# Patient Record
(demographics unavailable — no encounter records)

---

## 2025-01-03 NOTE — ASSESSMENT
[FreeTextEntry1] : follow up  Was recently hospitalized for Influenza, SVTs, COPD exacerbations feeling much better now   DM- has been well controlled   HTN- elevated today improved on recheck and was rushing out and forgot to take meds

## 2025-01-03 NOTE — HISTORY OF PRESENT ILLNESS
[FreeTextEntry1] : follow up [de-identified] : follow up  Was recently hospitalized for Influenza, SVTs, COPD exacerbations feeling much better now   DM- has been well controlled   HTN- elevated today

## 2025-01-03 NOTE — HEALTH RISK ASSESSMENT
[Intercurrent ED visits] : went to ED [No] : In the past 12 months have you used drugs other than those required for medical reasons? No [No falls in past year] : Patient reported no falls in the past year [0] : 2) Feeling down, depressed, or hopeless: Not at all (0) [de-identified] : no [de-identified] : walking [de-identified] : fair

## 2025-06-13 NOTE — COUNSELING
[Cessation strategies including cessation program discussed] : Cessation strategies including cessation program discussed [Use of nicotine replacement therapies and other medications discussed] : Use of nicotine replacement therapies and other medications discussed [Encouraged to pick a quit date and identify support needed to quit] : Encouraged to pick a quit date and identify support needed to quit [Yes] : Willing to quit smoking [ - Annual Lung Cancer Screening/Share Decision Making Discussion] : Annual Lung Cancer Screening/Share Decision Making Discussion. (I have advised this patient to have a Low Dose CT (LDCT) scan of the lungs and have discussed the following with the patient in a shared decision making discussion:   Benefits of Detection and Early Treatment: There is adequate evidence that annual screening for lung cancer with LDCT in a population of high-risk persons can prevent a substantial number of lung cancer-related deaths. The magnitude of benefit depends on the individual patient's risk for lung cancer, as those who are at highest risk are most likely to benefit. Screening cannot prevent most lung cancer-related deaths, and does not replace smoking cessation. Harms of Detection and Early Intervention and Treatment: The harms associated with LDCT screening include false-negative and false-positive results, incidental findings, over diagnosis, and radiation exposure. False-positive LDCT results occur in a substantial proportion of screened persons; 95% of all positive results do not lead to a diagnosis of cancer. In a high-quality screening program, further imaging can resolve most false-positive results; however, some patients may require invasive procedures. Radiation harms, including cancer resulting from cumulative exposure to radiation, vary depending on the age at the start of screening; the number of scans received; and the person's exposure to other sources of radiation, particularly other medical imaging.) [FreeTextEntry1] : 6

## 2025-06-13 NOTE — ASSESSMENT
[Vaccines Reviewed] : Immunizations reviewed today. Please see immunization details in the vaccine log within the immunization flowsheet.  [FreeTextEntry1] : annual exam  went to hospital last month for svt's  will refer to EP  HTN well controlled  DM- has been well controlled  health maintenance- due for colonoscopy due for mammo referrals placed  smoker for 45 years ppd= 45 year pack history will order lung cancer screening  spent > 5 minutes discussing smoking cessation   Blood work drawn in office today

## 2025-06-13 NOTE — HEALTH RISK ASSESSMENT
[Fair] : ~his/her~ current health as fair  [Good] : ~his/her~  mood as  good [Intercurrent ED visits] : went to ED [No] : In the past 12 months have you used drugs other than those required for medical reasons? No [No falls in past year] : Patient reported no falls in the past year [0] : 2) Feeling down, depressed, or hopeless: Not at all (0) [Current] : Current [Fully functional (bathing, dressing, toileting, transferring, walking, feeding)] : Fully functional (bathing, dressing, toileting, transferring, walking, feeding) [Fully functional (using the telephone, shopping, preparing meals, housekeeping, doing laundry, using] : Fully functional and needs no help or supervision to perform IADLs (using the telephone, shopping, preparing meals, housekeeping, doing laundry, using transportation, managing medications and managing finances) [Smoke Detector] : smoke detector [Carbon Monoxide Detector] : carbon monoxide detector [Seat Belt] :  uses seat belt [PHQ-2 Negative - No further assessment needed] : PHQ-2 Negative - No further assessment needed [Patient reported mammogram was normal] : Patient reported mammogram was normal [FreeTextEntry1] : none [de-identified] : none [de-identified] : walking [de-identified] : okay [[id number]] : 0 [de-identified] : smoker for 45 years 1 ppd [Reports changes in hearing] : Reports no changes in hearing [Reports changes in vision] : Reports no changes in vision [Sunscreen] : does not use sunscreen [MammogramDate] : 03/24 [PapSmearComments] : 1999 [ColonoscopyComments] : will order

## 2025-06-13 NOTE — HISTORY OF PRESENT ILLNESS
[FreeTextEntry1] : annual exam [de-identified] : annual exam  went to hospital last month for svt's  will refer to EP  HTN well controlled  DM- has been well controlled

## 2025-07-03 NOTE — CARDIOLOGY SUMMARY
[de-identified] : 7/3/25 NSR 64, PRWP [de-identified] : TTE 2024: 1. Technically difficult image quality. 2. Left ventricular cavity is severely dilated. Left ventricular systolic function is normal with an ejection fraction of 55 % by Vaz's method of disks. 3. Left atrium is severely dilated. 4. Normal right ventricular cavity size and normal right ventricular systolic function. 5. Moderate to severe mitral regurgitation. 6. Trileaflet aortic valve with normal systolic excursion. 7. Mild to moderate aortic regurgitation. 8. Mild pulmonic regurgitation. 9. The inferior vena cava is dilated (dilated >2.1cm) with abnormal inspiratory collapse (abnormal <50%) consistent with elevated right atrial pressure ( R 15, range 10-20mmHg). 10. No pericardial effusion seen.  TTE in 2019 with normalized EF [de-identified] :  LHC with normal coronaries

## 2025-07-03 NOTE — DISCUSSION/SUMMARY
[EKG obtained to assist in diagnosis and management of assessed problem(s)] : EKG obtained to assist in diagnosis and management of assessed problem(s) [FreeTextEntry1] : Che Munson is a 60 u/o woman with Hx of HTN, obesity, hx of HFrEF, mod-severe MR and PSVT  Impression:  1. PSVT:  EKG performed today to assess for presence of conduction disease, pre-excitation or atrial fibrillation reveals NSR. She has not started metoprolol 25 mg daily. A 4 week Zio AT will be placed today to assess for arrhythmias including SVT.   2. HFpEF: Hx of reduced LVEF in past, ?secondary to severe MR. Last TTE with normalized LV function. MR has bounced between mild and severe MR  Will discuss referral to structural heart team to ev mr.  3. HTN; Encourage heart healthy diet, sodium restriction and weight management.. Continue regular f/u with Cardiologist for further HTN management.    Continue f/u with Cardiologist and RTO for f/u

## 2025-07-03 NOTE — HISTORY OF PRESENT ILLNESS
[FreeTextEntry1] : Che Munson is a 60 u/o woman with Hx of HTN, obesity, hx of HFrEF with improved LVEF 55% (12/2024), mod-severe MR, syncope (2021), palpitations and PSVT who presents for an initial evaluation. Pt reports she has been having palpitations with Hx syncope 2021 and hospitalizations 12/2024 at [facility] and again 5/2025 with HR up to 180's. As per chart notes, he had a prior Hospitalized in 2017 for cardiomyopathy, EF 25% which has improved and has HFpEF and history of PSVT, triggered in the past by flu, COPD exacerbations. He was seen by cardiology and metoprolol succinate 25 mg was ordered daily but she has not started yet.  Currently, she still has episodes of heart racing but not as bad as it was in 5/2025. She has the palpitations daily that come and go. She is an active smoker and plans on quitting this week.  Denies chest pain, SOB, syncope or near syncope.

## 2025-07-03 NOTE — PHYSICAL EXAM
[Well Developed] : well developed [Well Nourished] : well nourished [No Acute Distress] : no acute distress [Normal Conjunctiva] : normal conjunctiva [Normal Venous Pressure] : normal venous pressure [Normal S1, S2] : normal S1, S2 [Clear Lung Fields] : clear lung fields [Good Air Entry] : good air entry [No Respiratory Distress] : no respiratory distress  [Soft] : abdomen soft [Non Tender] : non-tender [No Masses/organomegaly] : no masses/organomegaly [Normal Bowel Sounds] : normal bowel sounds [Normal Gait] : normal gait [No Cyanosis] : no cyanosis [No Clubbing] : no clubbing [No Varicosities] : no varicosities [No Rash] : no rash [No Skin Lesions] : no skin lesions [Moves all extremities] : moves all extremities [No Focal Deficits] : no focal deficits [Normal Speech] : normal speech [Alert and Oriented] : alert and oriented [Normal memory] : normal memory [Obese] : obese [de-identified] : 1+ edema of legs bilaterally

## 2025-07-03 NOTE — REASON FOR VISIT
[Arrhythmia/ECG Abnorrmalities] : arrhythmia/ECG abnormalities [FreeTextEntry3] : Dr. Carrington; Dr. Wang

## 2025-07-08 NOTE — HISTORY OF PRESENT ILLNESS
[FreeTextEntry1] : Ms. Munson is a 60F with HTN, obesity, hx of HFrEF, mod-severe MR presents in to establish care in the [facility]. Patient carries a FH of heart disease ( brother and Nephew) and personal history of current smoker, Obesity ( BMI 44), AUDIE was previously on CPAP ( not using since the machine is broken), HTN, HLD HFrEF EF 25%.   Hospitalized in 2017 for cardiomyopathy, EF 25% F/u [facility] with normal coronaries TTE in 2019 with normalized EF   Unclear etiology of reduced EF, possible secondary to valvular regurgitation  Pt otherwise feels well without CP, dyspnea, lightheadedness  Notes ongoing LE edema Hx of PSVT, triggered in the past by flu, COPD exacerbations   Current smoker. Brother may have heart disease  ECG: SR with PRWP, low voltage TTE 2024:  1. Technically difficult image quality.  2. Left ventricular cavity is severely dilated. Left ventricular systolic function is normal with an ejection fraction of 55 % by Vaz's method of disks.  3. Left atrium is severely dilated.  4. Normal right ventricular cavity size and normal right ventricular systolic function.  5. Moderate to severe mitral regurgitation.  6. Trileaflet aortic valve with normal systolic excursion.  7. Mild to moderate aortic regurgitation.  8. Mild pulmonic regurgitation.  9. The inferior vena cava is dilated (dilated >2.1cm) with abnormal inspiratory collapse (abnormal <50%) consistent with elevated right atrial pressure ( R 15, range 10-20mmHg). 10. No pericardial effusion seen.  # HLD : , , HDL 49, LDL 77   Advised lifestyle modification: In corporate exercise and healthy eating habits, focusing on a Mediterranean style of eating and aiming for the recommended 150 minutes per week of moderate physical activity.  Atorvastatin 20mg QHS   # HF rEF :H/o  LVEF 25% > TTE LVEF 55% ( 2024)  Furosemide 20mg skips on days that she leaves the house HCTZ 12.5mg skips on days that she leaves the house Continue Losartan 100 mg QD   # Counselled on smoking cessation  Continue Nicotene gum.   # HTN : Losartan 100mg Adding metoprolol 25mg daily ( Has not taken since it was dispensed from Pharmacy)  Maintain BP log  Call with Log in 2 weeks   # Pulmonary follow up with Dr. Gita Lisker   # PVC noted on EKG  Zio to quantify PVC burden  Continue optimal hydration  limit caffeinated beverages  Electrocardiogram to assess for any aberrancy Patient advised to monitor HR and notify if > 150 bpm.  Event monitor x 7 days to evaluate for any arrhythmic component..

## 2025-07-08 NOTE — DISCUSSION/SUMMARY
[FreeTextEntry1] : Ms. Munson is a 60F with HTN, obesity, hx of HFrEF, mod-severe MR presents in to establish care in the [facility]. Patient carries a FH of heart disease ( brother and Nephew) and personal history of current smoker, Obesity ( BMI 44), AUDIE was previously on CPAP ( not using since the machine is broken), HTN, HLD HFrEF EF 25%.   Hospitalized in 2017 for cardiomyopathy, EF 25% F/u [facility] with normal coronaries TTE in 2019 with normalized EF   Unclear etiology of reduced EF, possible secondary to valvular regurgitation  Pt otherwise feels well without CP, dyspnea, lightheadedness  Notes ongoing LE edema Hx of PSVT, triggered in the past by flu, COPD exacerbations   Current smoker. Brother may have heart disease  ECG: SR with PRWP, low voltage TTE 2024:  1. Technically difficult image quality.  2. Left ventricular cavity is severely dilated. Left ventricular systolic function is normal with an ejection fraction of 55 % by Vaz's method of disks.  3. Left atrium is severely dilated.  4. Normal right ventricular cavity size and normal right ventricular systolic function.  5. Moderate to severe mitral regurgitation.  6. Trileaflet aortic valve with normal systolic excursion.  7. Mild to moderate aortic regurgitation.  8. Mild pulmonic regurgitation.  9. The inferior vena cava is dilated (dilated >2.1cm) with abnormal inspiratory collapse (abnormal <50%) consistent with elevated right atrial pressure ( R 15, range 10-20mmHg). 10. No pericardial effusion seen.  # HLD : , , HDL 49, LDL 77   Advised lifestyle modification: In corporate exercise and healthy eating habits, focusing on a Mediterranean style of eating and aiming for the recommended 150 minutes per week of moderate physical activity.  Atorvastatin 20mg QHS   # HF rEF :H/o  LVEF 25% > TTE LVEF 55% ( 2024)  Furosemide 20mg skips on days that she leaves the house HCTZ 12.5mg skips on days that she leaves the house Continue Losartan 100 mg QD   # Counselled on smoking cessation  Continue Nicotene gum.   # HTN : Losartan 100mg Adding metoprolol 25mg daily ( Has not taken since it was dispensed from Pharmacy)  Maintain BP log  Call with Log in 2 weeks   # Pulmonary follow up with Dr. Gita Lisker   # PVC noted on EKG  Zio to quantify PVC burden  Continue optimal hydration  limit caffeinated beverages  Electrocardiogram to assess for any aberrancy Patient advised to monitor HR and notify if > 150 bpm.  Event monitor x 7 days to evaluate for any arrhythmic component..

## 2025-07-08 NOTE — CARDIOLOGY SUMMARY
[de-identified] : TTE 2024:  1. Technically difficult image quality.  2. Left ventricular cavity is severely dilated. Left ventricular systolic function is normal with an ejection fraction of 55 % by Vaz's method of disks.  3. Left atrium is severely dilated.  4. Normal right ventricular cavity size and normal right ventricular systolic function.  5. Moderate to severe mitral regurgitation.  6. Trileaflet aortic valve with normal systolic excursion.  7. Mild to moderate aortic regurgitation.  8. Mild pulmonic regurgitation.  9. The inferior vena cava is dilated (dilated >2.1cm) with abnormal inspiratory collapse (abnormal <50%) consistent with elevated right atrial pressure ( R 15, range 10-20mmHg). 10. No pericardial effusion seen. ================================================================================ TTE in 2019 with normalized EF   Unclear etiology of reduced EF, possible secondary to valvular regurgitation  [de-identified] :  2017 for cardiomyopathy, EF 25% F/u [facility] with normal coronaries